# Patient Record
Sex: MALE | Race: BLACK OR AFRICAN AMERICAN | NOT HISPANIC OR LATINO | Employment: OTHER | URBAN - METROPOLITAN AREA
[De-identification: names, ages, dates, MRNs, and addresses within clinical notes are randomized per-mention and may not be internally consistent; named-entity substitution may affect disease eponyms.]

---

## 2018-04-22 ENCOUNTER — HOSPITAL ENCOUNTER (EMERGENCY)
Facility: HOSPITAL | Age: 38
Discharge: HOME | End: 2018-04-22
Payer: MEDICAID

## 2018-04-22 VITALS
HEIGHT: 72 IN | RESPIRATION RATE: 20 BRPM | BODY MASS INDEX: 39.82 KG/M2 | TEMPERATURE: 99 F | SYSTOLIC BLOOD PRESSURE: 189 MMHG | HEART RATE: 68 BPM | DIASTOLIC BLOOD PRESSURE: 109 MMHG | WEIGHT: 294 LBS | OXYGEN SATURATION: 99 %

## 2018-04-22 DIAGNOSIS — M10.9 ACUTE GOUT OF MULTIPLE SITES, UNSPECIFIED CAUSE: Primary | ICD-10-CM

## 2018-04-22 DIAGNOSIS — I10 ESSENTIAL HYPERTENSION: ICD-10-CM

## 2018-04-22 PROCEDURE — 99283 EMERGENCY DEPT VISIT LOW MDM: CPT | Mod: 25

## 2018-04-22 PROCEDURE — 63600175 PHARM REV CODE 636 W HCPCS: Performed by: NURSE PRACTITIONER

## 2018-04-22 PROCEDURE — 25000003 PHARM REV CODE 250: Performed by: NURSE PRACTITIONER

## 2018-04-22 PROCEDURE — 96372 THER/PROPH/DIAG INJ SC/IM: CPT

## 2018-04-22 RX ORDER — KETOROLAC TROMETHAMINE 30 MG/ML
60 INJECTION, SOLUTION INTRAMUSCULAR; INTRAVENOUS
Status: COMPLETED | OUTPATIENT
Start: 2018-04-22 | End: 2018-04-22

## 2018-04-22 RX ORDER — CLONIDINE HYDROCHLORIDE 0.2 MG/1
0.2 TABLET ORAL
Status: COMPLETED | OUTPATIENT
Start: 2018-04-22 | End: 2018-04-22

## 2018-04-22 RX ORDER — METHYLPREDNISOLONE SOD SUCC 125 MG
125 VIAL (EA) INJECTION
Status: COMPLETED | OUTPATIENT
Start: 2018-04-22 | End: 2018-04-22

## 2018-04-22 RX ORDER — PREDNISONE 10 MG/1
10 TABLET ORAL DAILY
Qty: 21 TABLET | Refills: 0 | Status: SHIPPED | OUTPATIENT
Start: 2018-04-22 | End: 2018-05-02

## 2018-04-22 RX ORDER — INDOMETHACIN 50 MG/1
50 CAPSULE ORAL
Qty: 15 CAPSULE | Refills: 0 | Status: SHIPPED | OUTPATIENT
Start: 2018-04-22 | End: 2018-10-28

## 2018-04-22 RX ADMIN — METHYLPREDNISOLONE SODIUM SUCCINATE 125 MG: 125 INJECTION, POWDER, FOR SOLUTION INTRAMUSCULAR; INTRAVENOUS at 08:04

## 2018-04-22 RX ADMIN — CLONIDINE HYDROCHLORIDE 0.2 MG: 0.2 TABLET ORAL at 08:04

## 2018-04-22 RX ADMIN — KETOROLAC TROMETHAMINE 60 MG: 30 INJECTION, SOLUTION INTRAMUSCULAR; INTRAVENOUS at 08:04

## 2018-04-22 NOTE — ED NOTES
Bed: TL 02  Expected date:   Expected time:   Means of arrival:   Comments:     Alexa Navarro RN  04/22/18 0853

## 2018-04-22 NOTE — ED PROVIDER NOTES
"Encounter Date: 4/22/2018       History     Chief Complaint   Patient presents with    Joint Swelling     Pt states, "My gout is flaring up in my right knee and both ankles and feet."     The history is provided by the patient.   General Illness    The current episode started two days ago (gout flare up t o right knee and left foot, was treated last month for dame but no longer taking medications, has not taken BP meds today ). The problem occurs occasionally. The problem has been gradually worsening. The pain is at a severity of 7/10. Nothing relieves the symptoms. The symptoms are aggravated by movement. Pertinent negatives include no fever, no decreased vision, no double vision, no abdominal pain, no constipation, no diarrhea, no nausea, no vomiting, no congestion, no headaches, no sore throat, no muscle aches, no neck pain, no neck stiffness, no cough, no shortness of breath, no URI, no wheezing, no rash and no diaper rash. Services performed: flip unsure of medication name  Recently, medical care has been given by the PCP.     Review of patient's allergies indicates:  No Known Allergies  No past medical history on file.  No past surgical history on file.  No family history on file.  Social History   Substance Use Topics    Smoking status: Not on file    Smokeless tobacco: Not on file    Alcohol use Not on file     Review of Systems   Constitutional: Negative for fever.   HENT: Negative for congestion and sore throat.    Eyes: Negative for double vision.   Respiratory: Negative for cough, shortness of breath and wheezing.    Cardiovascular: Negative for chest pain.   Gastrointestinal: Negative for abdominal pain, constipation, diarrhea, nausea and vomiting.   Genitourinary: Negative for dysuria.   Musculoskeletal: Positive for joint swelling. Negative for back pain and neck pain.        Right knee and left foot, warm to touch    Skin: Negative for rash.   Neurological: Negative for weakness and headaches. "   Hematological: Does not bruise/bleed easily.       Physical Exam     Initial Vitals [04/22/18 0814]   BP Pulse Resp Temp SpO2   (!) 224/125 67 20 98.7 °F (37.1 °C) 99 %      MAP       158         Physical Exam    Nursing note and vitals reviewed.  Constitutional: Vital signs are normal. He appears well-nourished. He is not diaphoretic. He is cooperative.  Non-toxic appearance. He does not have a sickly appearance. He does not appear ill. No distress.   HENT:   Head: Normocephalic. Head is without laceration.   Right Ear: External ear normal.   Left Ear: External ear normal.   Eyes: Conjunctivae and lids are normal. Lids are everted and swept, no foreign bodies found.   Neck: Trachea normal, normal range of motion, full passive range of motion without pain and phonation normal. Neck supple. No thyromegaly present.   Cardiovascular: Regular rhythm, normal heart sounds, intact distal pulses and normal pulses.   Abdominal: Soft. Normal appearance and bowel sounds are normal. He exhibits no distension, no ascites and no mass. There is no tenderness.   Musculoskeletal:        Right knee: He exhibits swelling and erythema. He exhibits normal range of motion, no effusion, no ecchymosis, no deformity, no laceration, normal alignment, no LCL laxity, no bony tenderness, normal meniscus and no MCL laxity. Tenderness found. No medial joint line, no lateral joint line, no MCL, no LCL and no patellar tendon tenderness noted.        Left foot: There is swelling. There is normal range of motion, no tenderness, no bony tenderness, normal capillary refill, no crepitus, no deformity and no laceration.        Feet:    Lymphadenopathy:     He has no cervical adenopathy.     He has no axillary adenopathy.   Neurological: He is alert and oriented to person, place, and time. He has normal reflexes. No cranial nerve deficit or sensory deficit. GCS eye subscore is 4. GCS verbal subscore is 5. GCS motor subscore is 6.   Skin: Skin is warm,  dry and intact. Capillary refill takes less than 2 seconds. No laceration, no rash and no abscess noted. No erythema.   Psychiatric: He has a normal mood and affect. His speech is normal and behavior is normal. Cognition and memory are normal.         ED Course   Procedures  Labs Reviewed - No data to display     The nature of gout is fully explained, including dietary relationship, acute and interval phase and treatment of both. Long term complications such as kidney stones, tophi and arthritis are discussed. Avoidance of alcohol recommended, and written literature is given along with a low purine diet. Indications for the use of allopurinol for prophylaxis and the use of colchicine to prevent or treat flare-ups is also discussed. Proper use of indomethacin for acute attacks discussed, and its side effects. Call if further attacks occur, or this one does not resolve promptly.Regarding HYPERTENSION, I advised patient to: keep a record of blood pressure results; take your blood pressure medication exactly as directed without skipping doses; avoid medications that contain heart stimulants, including over-the-counter drugs such as decongestants; maintain a healthy weight; cut back on sodium intake (i.e., limit canned, dried, packaged, and fast foods and dont add salt to food); follow the DASH (Dietary Approaches to Stop Hypertension) eating plan which recommends vegetables, fruits, whole gains, and other heart healthy foods; begin an exercise program that includes  aerobic exercise 3 to 4 times a week for an average of 40 minutes at a time (with approval of cardiologist or primary care provider); limit drinks that contain alcohol and caffeine; control levels of emotional stress; and seek emergency care for any shortness of breath, chest pain, difficulty speaking, confusion, or visual changes.        All historical, clinical, radiographic, and laboratory findings were reviewed with the patient in detail.  Findings are  consistent with a diagnosis of GOUT, HTN .  All remaining questions and concerns were addressed at that time.  Patient has been counseled regarding the need for follow-up as well as the indication to return to the emergency room should new or worrisome developments occur.                        Clinical Impression:   The primary encounter diagnosis was Acute gout of multiple sites, unspecified cause. A diagnosis of Essential hypertension was also pertinent to this visit.                           Gabe Chaudhari NP  04/22/18 1043

## 2018-10-28 ENCOUNTER — HOSPITAL ENCOUNTER (EMERGENCY)
Facility: HOSPITAL | Age: 38
Discharge: LEFT BEFORE DISCHARGE | End: 2018-10-28
Attending: EMERGENCY MEDICINE
Payer: MEDICAID

## 2018-10-28 VITALS
RESPIRATION RATE: 16 BRPM | WEIGHT: 299.81 LBS | DIASTOLIC BLOOD PRESSURE: 113 MMHG | TEMPERATURE: 99 F | SYSTOLIC BLOOD PRESSURE: 211 MMHG | BODY MASS INDEX: 37.28 KG/M2 | OXYGEN SATURATION: 100 % | HEART RATE: 65 BPM | HEIGHT: 75 IN

## 2018-10-28 DIAGNOSIS — S62.354A NONDISPLACED FRACTURE OF SHAFT OF FOURTH METACARPAL BONE, RIGHT HAND, INITIAL ENCOUNTER FOR CLOSED FRACTURE: Primary | ICD-10-CM

## 2018-10-28 DIAGNOSIS — I10 ESSENTIAL HYPERTENSION: ICD-10-CM

## 2018-10-28 LAB
ALBUMIN SERPL BCP-MCNC: 3.8 G/DL
ALP SERPL-CCNC: 76 U/L
ALT SERPL W/O P-5'-P-CCNC: 21 U/L
ANION GAP SERPL CALC-SCNC: 10 MMOL/L
AST SERPL-CCNC: 23 U/L
BASOPHILS # BLD AUTO: 0.01 K/UL
BASOPHILS NFR BLD: 0.2 %
BILIRUB SERPL-MCNC: 0.6 MG/DL
BUN SERPL-MCNC: 46 MG/DL
CALCIUM SERPL-MCNC: 9.4 MG/DL
CHLORIDE SERPL-SCNC: 106 MMOL/L
CO2 SERPL-SCNC: 25 MMOL/L
CREAT SERPL-MCNC: 5.8 MG/DL
CRP SERPL-MCNC: 1.9 MG/L
DIFFERENTIAL METHOD: ABNORMAL
EOSINOPHIL # BLD AUTO: 0.1 K/UL
EOSINOPHIL NFR BLD: 1.3 %
ERYTHROCYTE [DISTWIDTH] IN BLOOD BY AUTOMATED COUNT: 14.2 %
EST. GFR  (AFRICAN AMERICAN): 13 ML/MIN/1.73 M^2
EST. GFR  (NON AFRICAN AMERICAN): 11 ML/MIN/1.73 M^2
GLUCOSE SERPL-MCNC: 101 MG/DL
HCT VFR BLD AUTO: 32.1 %
HGB BLD-MCNC: 10.5 G/DL
LYMPHOCYTES # BLD AUTO: 1.9 K/UL
LYMPHOCYTES NFR BLD: 39.8 %
MCH RBC QN AUTO: 27.2 PG
MCHC RBC AUTO-ENTMCNC: 32.7 G/DL
MCV RBC AUTO: 83 FL
MONOCYTES # BLD AUTO: 0.2 K/UL
MONOCYTES NFR BLD: 3.8 %
NEUTROPHILS # BLD AUTO: 2.6 K/UL
NEUTROPHILS NFR BLD: 54.9 %
PLATELET # BLD AUTO: 136 K/UL
PMV BLD AUTO: 11.1 FL
POTASSIUM SERPL-SCNC: 3.6 MMOL/L
PROT SERPL-MCNC: 7.6 G/DL
RBC # BLD AUTO: 3.86 M/UL
SODIUM SERPL-SCNC: 141 MMOL/L
URATE SERPL-MCNC: 10.5 MG/DL
WBC # BLD AUTO: 4.8 K/UL

## 2018-10-28 PROCEDURE — 80053 COMPREHEN METABOLIC PANEL: CPT

## 2018-10-28 PROCEDURE — 86140 C-REACTIVE PROTEIN: CPT

## 2018-10-28 PROCEDURE — 85025 COMPLETE CBC W/AUTO DIFF WBC: CPT

## 2018-10-28 PROCEDURE — 84550 ASSAY OF BLOOD/URIC ACID: CPT

## 2018-10-28 PROCEDURE — 25000003 PHARM REV CODE 250: Performed by: EMERGENCY MEDICINE

## 2018-10-28 PROCEDURE — 29125 APPL SHORT ARM SPLINT STATIC: CPT | Mod: RT

## 2018-10-28 PROCEDURE — 99284 EMERGENCY DEPT VISIT MOD MDM: CPT | Mod: 25

## 2018-10-28 RX ORDER — HYDROCODONE BITARTRATE AND ACETAMINOPHEN 10; 325 MG/1; MG/1
1 TABLET ORAL EVERY 6 HOURS PRN
Qty: 9 TABLET | Refills: 0 | Status: SHIPPED | OUTPATIENT
Start: 2018-10-28

## 2018-10-28 RX ORDER — AMLODIPINE BESYLATE 10 MG/1
10 TABLET ORAL DAILY
COMMUNITY

## 2018-10-28 RX ORDER — CARVEDILOL 12.5 MG/1
12.5 TABLET ORAL 2 TIMES DAILY WITH MEALS
COMMUNITY

## 2018-10-28 RX ORDER — KETOROLAC TROMETHAMINE 10 MG/1
10 TABLET, FILM COATED ORAL
Status: COMPLETED | OUTPATIENT
Start: 2018-10-28 | End: 2018-10-28

## 2018-10-28 RX ORDER — HYDRALAZINE HYDROCHLORIDE 25 MG/1
100 TABLET, FILM COATED ORAL NIGHTLY
COMMUNITY

## 2018-10-28 RX ORDER — CLONIDINE HYDROCHLORIDE 0.2 MG/1
0.2 TABLET ORAL
Status: COMPLETED | OUTPATIENT
Start: 2018-10-28 | End: 2018-10-28

## 2018-10-28 RX ADMIN — KETOROLAC TROMETHAMINE 10 MG: 10 TABLET, FILM COATED ORAL at 10:10

## 2018-10-28 RX ADMIN — CLONIDINE HYDROCHLORIDE 0.2 MG: 0.2 TABLET ORAL at 10:10

## 2018-10-29 NOTE — ED NOTES
Report received from Tereza WLILIAMSON. MD Anand states that pt is to remain in ER until BP decreases. Pt aware. CP denied. Splint applied by previous nurse. Pt resting in bed. NAD noted. AAO x 3. Will resume care.

## 2018-10-29 NOTE — DISCHARGE INSTRUCTIONS
You have a closed fracture of the ring finger of the right hand metacarpal.  Some minimally angulated nondisplaced.  Keep the splint on.  Ibuprofen for pain. Norco for breakthrough pain.  Follow up with Dr. Amaya tomorrow for re-evaluation and referral to orthopedics if needed.  Return as needed for any worsening symptoms, problems, questions or control.  Please take her blood pressure medicines as prescribed in order to keep her blood pressure under control.  Today blood pressure was way too high and in need of further treatment.  Your medicines only work if you take them.

## 2018-10-29 NOTE — ED NOTES
Pt states that he is unable to remain in ER until his BP decreases. MD Anand notified of BP at 211/113. MD has informed pt that he will have to sign out AMA. Pt has agreed. Forms signed by MD, pt, and witness by RN.

## 2018-10-29 NOTE — ED PROVIDER NOTES
SCRIBE #1 NOTE: I, Amina Davidson, am scribing for, and in the presence of, Kyaw Anand Jr., MD. I have scribed the entire note.      History      Chief Complaint   Patient presents with    Gout     pts right hand swollen and pain for 2 days. pt has a hx of gout and takes prednisone        Review of patient's allergies indicates:  No Known Allergies     HPI   HPI    10/28/2018, 9:44 PM   History obtained from the patient      History of Present Illness: Yandel Albright is a 38 y.o. male patient with a PMHx of gout who presents to the Emergency Department for R hand pain/swelling which onset gradually after hitting R hand against a truck 2 days ago. Symptoms are constant and moderate in severity.  No mitigating or exacerbating factors reported. No associated sxs reported. Patient denies any fever, chills, extremity weakness/numbness, decreased ROM of R hand, wound, HA, dizziness, CP, SOB, n/v, and all other sxs at this time. Pt reports not taking his BP medication today. No further complaints or concerns at this time.         Arrival mode: Personal vehicle      PCP: Myke Rodriguez MD       Past Medical History:  Past medical history reviewed not relevant      Past Surgical History:  Past surgical history reviewed not relevant      Family History:  Family history reviewed not relevant      Social History:  Social History    Social History Main Topics    Social History Main Topics    Smoking status: Unknown if ever smoked    Smokeless tobacco: Unknown if ever used    Alcohol Use: Unknown drinking history    Drug Use: Unknown if ever used    Sexual Activity: Unknown         ROS   Review of Systems   Constitutional: Negative for chills and fever.   HENT: Negative for sore throat.    Respiratory: Negative for shortness of breath.    Cardiovascular: Negative for chest pain.   Gastrointestinal: Negative for nausea and vomiting.   Genitourinary: Negative for dysuria.   Musculoskeletal: Negative for back pain.         + R hand pain/swelling -other pain/injury -decreased ROM of R hand   Skin: Negative for rash and wound.   Neurological: Negative for dizziness, weakness, numbness and headaches.   Hematological: Does not bruise/bleed easily.   All other systems reviewed and are negative.      Physical Exam      Initial Vitals [10/28/18 2109]   BP Pulse Resp Temp SpO2   (!) 232/130 65 17 98.8 °F (37.1 °C) 99 %      MAP       --          Physical Exam  Nursing Notes and Vital Signs Reviewed.  Constitutional: Patient is in no acute distress. Well-developed and well-nourished.  Head: Atraumatic. Normocephalic.  Eyes: PERRL. EOM intact. Conjunctivae are not pale. No scleral icterus.  ENT: Mucous membranes are moist. Oropharynx is clear and symmetric.    Neck: Supple. Full ROM. No lymphadenopathy.  Cardiovascular: Regular rate. Regular rhythm. No murmurs, rubs, or gallops. Distal pulses are 2+ and symmetric.  Pulmonary/Chest: No respiratory distress. Clear to auscultation bilaterally. No wheezing or rales.  Abdominal: Soft and non-distended.  There is no tenderness.  No rebound, guarding, or rigidity.   Musculoskeletal: Moves all extremities. No obvious deformities. No edema.   Right Hand: There is swelling to the base of the R ring finger metacarpal with tenderness. No erythema, warmth, crepitance, or step-offs. Full flexion and extension of the wrist. Radial, median, and ulnar nerves are intact. Radial and ulnar pulses are 2+. Normal capillary refill.  Distal sensation is intact.  Skin: Warm and dry.  Neurological:  Alert, awake, and appropriate.  Normal speech.  No acute focal neurological deficits are appreciated.  Psychiatric: Normal affect. Good eye contact. Appropriate in content.    ED Course    Procedures  ED Vital Signs:  Vitals:    10/28/18 2109 10/28/18 2200 10/28/18 2256   BP: (!) 232/130 (!) 213/113 (!) 202/112   Pulse: 65 64    Resp: 17 18    Temp: 98.8 °F (37.1 °C)     TempSrc: Oral     SpO2: 99% 100%    Weight: 136  "kg (299 lb 13.2 oz)     Height: 6' 3" (1.905 m)         Abnormal Lab Results:  Labs Reviewed   COMPREHENSIVE METABOLIC PANEL - Abnormal; Notable for the following components:       Result Value    BUN, Bld 46 (*)     Creatinine 5.8 (*)     eGFR if  13 (*)     eGFR if non  11 (*)     All other components within normal limits   CBC W/ AUTO DIFFERENTIAL - Abnormal; Notable for the following components:    RBC 3.86 (*)     Hemoglobin 10.5 (*)     Hematocrit 32.1 (*)     Platelets 136 (*)     Mono # 0.2 (*)     Mono% 3.8 (*)     All other components within normal limits   URIC ACID - Abnormal; Notable for the following components:    Uric Acid 10.5 (*)     All other components within normal limits   C-REACTIVE PROTEIN        All Lab Results:  Results for orders placed or performed during the hospital encounter of 10/28/18   Comprehensive metabolic panel   Result Value Ref Range    Sodium 141 136 - 145 mmol/L    Potassium 3.6 3.5 - 5.1 mmol/L    Chloride 106 95 - 110 mmol/L    CO2 25 23 - 29 mmol/L    Glucose 101 70 - 110 mg/dL    BUN, Bld 46 (H) 6 - 20 mg/dL    Creatinine 5.8 (H) 0.5 - 1.4 mg/dL    Calcium 9.4 8.7 - 10.5 mg/dL    Total Protein 7.6 6.0 - 8.4 g/dL    Albumin 3.8 3.5 - 5.2 g/dL    Total Bilirubin 0.6 0.1 - 1.0 mg/dL    Alkaline Phosphatase 76 55 - 135 U/L    AST 23 10 - 40 U/L    ALT 21 10 - 44 U/L    Anion Gap 10 8 - 16 mmol/L    eGFR if African American 13 (A) >60 mL/min/1.73 m^2    eGFR if non African American 11 (A) >60 mL/min/1.73 m^2   CBC auto differential   Result Value Ref Range    WBC 4.80 3.90 - 12.70 K/uL    RBC 3.86 (L) 4.60 - 6.20 M/uL    Hemoglobin 10.5 (L) 14.0 - 18.0 g/dL    Hematocrit 32.1 (L) 40.0 - 54.0 %    MCV 83 82 - 98 fL    MCH 27.2 27.0 - 31.0 pg    MCHC 32.7 32.0 - 36.0 g/dL    RDW 14.2 11.5 - 14.5 %    Platelets 136 (L) 150 - 350 K/uL    MPV 11.1 9.2 - 12.9 fL    Gran # (ANC) 2.6 1.8 - 7.7 K/uL    Lymph # 1.9 1.0 - 4.8 K/uL    Mono # 0.2 (L) 0.3 - " 1.0 K/uL    Eos # 0.1 0.0 - 0.5 K/uL    Baso # 0.01 0.00 - 0.20 K/uL    Gran% 54.9 38.0 - 73.0 %    Lymph% 39.8 18.0 - 48.0 %    Mono% 3.8 (L) 4.0 - 15.0 %    Eosinophil% 1.3 0.0 - 8.0 %    Basophil% 0.2 0.0 - 1.9 %    Differential Method Automated    Uric acid   Result Value Ref Range    Uric Acid 10.5 (H) 3.4 - 7.0 mg/dL   C-reactive protein   Result Value Ref Range    CRP 1.9 0.0 - 8.2 mg/L         Imaging Results:  Imaging Results          X-Ray Hand 3 View Right (Final result)  Result time 10/28/18 22:26:55    Final result by Renny Terrazas MD (10/28/18 22:26:55)                 Impression:      As above.      Electronically signed by: Renny Terrazas  Date:    10/28/2018  Time:    22:26             Narrative:    EXAMINATION:  XR HAND COMPLETE 3 VIEW RIGHT    CLINICAL HISTORY:  trauma;    TECHNIQUE:  PA, lateral, and oblique views of the right hand were performed.    COMPARISON:  None    FINDINGS:  Comminuted nondisplaced fracture of the 3rd metacarpal shaft.  Soft tissue swelling.                                        The Emergency Provider reviewed the vital signs and test results, which are outlined above.    ED Discussion     10:33 PM: Re-evaluated pt. Discussed X-ray results that show comminuted nondisplaced fracture of the 3rd metacarpal shaft.       10:56 PM  Patient is stable nontoxic.  Splint has been applied in is neurovascularly intact postprocedure.  I discussed with the patient all findings have recommend for primary care follow-up for referral Orthopedics.  He verbalized understanding agreement with all.  The patient is noncompliant with his blood pressure medicine over the past 2 days.  I have advised that he take his medications which he has at home.  Verbalized understanding.      11:00 PM: Reassessed pt at this time.  Pt is awake, alert, and in NAD at this time. Discussed with pt all pertinent ED information and results. Discussed pt dx and plan of tx. Gave pt all f/u and return to the ED  instructions. All questions and concerns were addressed at this time. Pt expresses understanding of information and instructions, and is comfortable with plan to discharge. Pt is stable for discharge.    Pre-hypertension/Hypertension: The pt has been informed that they may have pre-hypertension or hypertension based on a blood pressure reading in the ED. I recommend that the pt call the PCP listed on their discharge instructions or a physician of their choice this week to arrange f/u for further evaluation of possible pre-hypertension or hypertension.       I discussed with patient and/or family/caretaker that evaluation in the ED does not suggest any emergent or life threatening medical conditions requiring immediate intervention beyond what was provided in the ED, and I believe patient is safe for discharge.  Regardless, an unremarkable evaluation in the ED does not preclude the development or presence of a serious of life threatening condition. As such, patient was instructed to return immediately for any worsening or change in current symptoms.    11:28 PM  Patient's blood pressure has not come down quite as well as we would have liked.  Patient declines any further treatment signs AMA for his blood pressure.  He has left AMA for his blood pressure.  Aware of all the findings as well as diagnosis and plan of care.    Current Discharge Medication List      START taking these medications    Details   HYDROcodone-acetaminophen (NORCO)  mg per tablet Take 1 tablet by mouth every 6 (six) hours as needed for Pain.  Qty: 9 tablet, Refills: 0         CONTINUE these medications which have NOT CHANGED    Details   amLODIPine (NORVASC) 10 MG tablet Take 10 mg by mouth once daily.      carvedilol (COREG) 12.5 MG tablet Take 12.5 mg by mouth 2 (two) times daily with meals.      hydrALAZINE (APRESOLINE) 25 MG tablet Take 100 mg by mouth nightly.         STOP taking these medications       indomethacin (INDOCIN) 50 MG  capsule Comments:   Reason for Stopping:               ED Medication(s):  Medications   ketorolac tablet 10 mg (10 mg Oral Given 10/28/18 2201)   cloNIDine tablet 0.2 mg (0.2 mg Oral Given 10/28/18 2201)       Follow-up Information     Myke Rodriguez MD In 1 day.    Specialty:  Internal Medicine  Contact information:  [address] [place] [id number] [phone]                     Medical Decision Making    Medical Decision Making:   Clinical Tests:   Lab Tests: Ordered and Reviewed  Radiological Study: Ordered and Reviewed           Scribe Attestation:   Scribe #1: I performed the above scribed service and the documentation accurately describes the services I performed. I attest to the accuracy of the note.    Attending:   Physician Attestation Statement for Scribe #1: I, Kyaw Anand Jr., MD, personally performed the services described in this documentation, as scribed by Amina Davidson, in my presence, and it is both accurate and complete.          Clinical Impression       ICD-10-CM ICD-9-CM   1. Nondisplaced fracture of shaft of fourth metacarpal bone, right hand, initial encounter for closed fracture S62.354A 815.03   2. Essential hypertension I10 401.9       Disposition:   Disposition: Discharged  Condition: Stable         Kyaw Anand Jr., MD  10/28/18 2901

## 2021-01-16 ENCOUNTER — HOSPITAL ENCOUNTER (EMERGENCY)
Facility: HOSPITAL | Age: 41
Discharge: HOME | End: 2021-01-17
Attending: EMERGENCY MEDICINE
Payer: MEDICAID

## 2021-01-16 DIAGNOSIS — U07.1 COVID-19: Primary | ICD-10-CM

## 2021-01-16 DIAGNOSIS — R11.2 NAUSEA & VOMITING: ICD-10-CM

## 2021-01-16 DIAGNOSIS — N18.5 STAGE 5 CHRONIC KIDNEY DISEASE NOT ON CHRONIC DIALYSIS: ICD-10-CM

## 2021-01-16 DIAGNOSIS — D64.9 ANEMIA, UNSPECIFIED TYPE: ICD-10-CM

## 2021-01-16 PROCEDURE — 93010 ELECTROCARDIOGRAM REPORT: CPT | Mod: ,,, | Performed by: INTERNAL MEDICINE

## 2021-01-16 PROCEDURE — 93005 ELECTROCARDIOGRAM TRACING: CPT

## 2021-01-16 PROCEDURE — 99285 EMERGENCY DEPT VISIT HI MDM: CPT | Mod: 25

## 2021-01-16 PROCEDURE — 96365 THER/PROPH/DIAG IV INF INIT: CPT

## 2021-01-16 PROCEDURE — 93010 EKG 12-LEAD: ICD-10-PCS | Mod: ,,, | Performed by: INTERNAL MEDICINE

## 2021-01-16 RX ORDER — ONDANSETRON 2 MG/ML
4 INJECTION INTRAMUSCULAR; INTRAVENOUS
Status: DISCONTINUED | OUTPATIENT
Start: 2021-01-16 | End: 2021-01-16

## 2021-01-17 VITALS
RESPIRATION RATE: 17 BRPM | BODY MASS INDEX: 33.97 KG/M2 | DIASTOLIC BLOOD PRESSURE: 125 MMHG | HEIGHT: 75 IN | TEMPERATURE: 98 F | OXYGEN SATURATION: 100 % | WEIGHT: 273.25 LBS | HEART RATE: 65 BPM | SYSTOLIC BLOOD PRESSURE: 243 MMHG

## 2021-01-17 LAB
ALBUMIN SERPL BCP-MCNC: 3.5 G/DL (ref 3.5–5.2)
ALP SERPL-CCNC: 62 U/L (ref 55–135)
ALT SERPL W/O P-5'-P-CCNC: 18 U/L (ref 10–44)
ANION GAP SERPL CALC-SCNC: 17 MMOL/L (ref 8–16)
AST SERPL-CCNC: 17 U/L (ref 10–40)
BASOPHILS # BLD AUTO: 0 K/UL (ref 0–0.2)
BASOPHILS NFR BLD: 0 % (ref 0–1.9)
BILIRUB SERPL-MCNC: 0.6 MG/DL (ref 0.1–1)
BUN SERPL-MCNC: 83 MG/DL (ref 6–20)
CALCIUM SERPL-MCNC: 8.7 MG/DL (ref 8.7–10.5)
CHLORIDE SERPL-SCNC: 105 MMOL/L (ref 95–110)
CO2 SERPL-SCNC: 19 MMOL/L (ref 23–29)
CREAT SERPL-MCNC: 11.3 MG/DL (ref 0.5–1.4)
DIFFERENTIAL METHOD: ABNORMAL
EOSINOPHIL # BLD AUTO: 0 K/UL (ref 0–0.5)
EOSINOPHIL NFR BLD: 0.8 % (ref 0–8)
ERYTHROCYTE [DISTWIDTH] IN BLOOD BY AUTOMATED COUNT: 13.4 % (ref 11.5–14.5)
EST. GFR  (AFRICAN AMERICAN): 6 ML/MIN/1.73 M^2
EST. GFR  (NON AFRICAN AMERICAN): 5 ML/MIN/1.73 M^2
GLUCOSE SERPL-MCNC: 98 MG/DL (ref 70–110)
HCT VFR BLD AUTO: 25.7 % (ref 40–54)
HCV AB SERPL QL IA: NEGATIVE
HGB BLD-MCNC: 8.4 G/DL (ref 14–18)
HIV 1+2 AB+HIV1 P24 AG SERPL QL IA: NEGATIVE
IMM GRANULOCYTES # BLD AUTO: 0.04 K/UL (ref 0–0.04)
IMM GRANULOCYTES NFR BLD AUTO: 1 % (ref 0–0.5)
LYMPHOCYTES # BLD AUTO: 0.7 K/UL (ref 1–4.8)
LYMPHOCYTES NFR BLD: 18.6 % (ref 18–48)
MCH RBC QN AUTO: 27.2 PG (ref 27–31)
MCHC RBC AUTO-ENTMCNC: 32.7 G/DL (ref 32–36)
MCV RBC AUTO: 83 FL (ref 82–98)
MONOCYTES # BLD AUTO: 0.3 K/UL (ref 0.3–1)
MONOCYTES NFR BLD: 7.4 % (ref 4–15)
NEUTROPHILS # BLD AUTO: 2.8 K/UL (ref 1.8–7.7)
NEUTROPHILS NFR BLD: 72.2 % (ref 38–73)
NRBC BLD-RTO: 0 /100 WBC
PLATELET # BLD AUTO: 130 K/UL (ref 150–350)
PMV BLD AUTO: 12.8 FL (ref 9.2–12.9)
POTASSIUM SERPL-SCNC: 3.9 MMOL/L (ref 3.5–5.1)
PROT SERPL-MCNC: 7.8 G/DL (ref 6–8.4)
RBC # BLD AUTO: 3.09 M/UL (ref 4.6–6.2)
SODIUM SERPL-SCNC: 141 MMOL/L (ref 136–145)
WBC # BLD AUTO: 3.93 K/UL (ref 3.9–12.7)

## 2021-01-17 PROCEDURE — 63600175 PHARM REV CODE 636 W HCPCS: Performed by: EMERGENCY MEDICINE

## 2021-01-17 PROCEDURE — 86703 HIV-1/HIV-2 1 RESULT ANTBDY: CPT

## 2021-01-17 PROCEDURE — 85025 COMPLETE CBC W/AUTO DIFF WBC: CPT

## 2021-01-17 PROCEDURE — 86803 HEPATITIS C AB TEST: CPT

## 2021-01-17 PROCEDURE — 36415 COLL VENOUS BLD VENIPUNCTURE: CPT

## 2021-01-17 PROCEDURE — 25000003 PHARM REV CODE 250: Performed by: EMERGENCY MEDICINE

## 2021-01-17 PROCEDURE — 80053 COMPREHEN METABOLIC PANEL: CPT

## 2021-01-17 RX ORDER — HYDRALAZINE HYDROCHLORIDE 25 MG/1
25 TABLET, FILM COATED ORAL
Status: DISCONTINUED | OUTPATIENT
Start: 2021-01-17 | End: 2021-01-17

## 2021-01-17 RX ORDER — HYDRALAZINE HYDROCHLORIDE 25 MG/1
100 TABLET, FILM COATED ORAL
Status: COMPLETED | OUTPATIENT
Start: 2021-01-17 | End: 2021-01-17

## 2021-01-17 RX ORDER — AMLODIPINE BESYLATE 5 MG/1
10 TABLET ORAL
Status: COMPLETED | OUTPATIENT
Start: 2021-01-17 | End: 2021-01-17

## 2021-01-17 RX ORDER — CARVEDILOL 12.5 MG/1
12.5 TABLET ORAL 2 TIMES DAILY
Status: DISCONTINUED | OUTPATIENT
Start: 2021-01-17 | End: 2021-01-17

## 2021-01-17 RX ORDER — PROMETHAZINE HYDROCHLORIDE 25 MG/1
25 TABLET ORAL EVERY 6 HOURS PRN
Qty: 20 TABLET | Refills: 0 | Status: SHIPPED | OUTPATIENT
Start: 2021-01-17

## 2021-01-17 RX ORDER — ONDANSETRON 8 MG/1
8 TABLET, ORALLY DISINTEGRATING ORAL EVERY 6 HOURS PRN
Qty: 20 TABLET | Refills: 0 | Status: SHIPPED | OUTPATIENT
Start: 2021-01-17

## 2021-01-17 RX ADMIN — PROMETHAZINE HYDROCHLORIDE 25 MG: 25 INJECTION INTRAMUSCULAR; INTRAVENOUS at 12:01

## 2021-01-17 RX ADMIN — SODIUM CHLORIDE 500 ML: 0.9 INJECTION, SOLUTION INTRAVENOUS at 12:01

## 2021-01-17 RX ADMIN — HYDRALAZINE HYDROCHLORIDE 100 MG: 25 TABLET, FILM COATED ORAL at 01:01

## 2021-01-17 RX ADMIN — AMLODIPINE BESYLATE 10 MG: 5 TABLET ORAL at 01:01
